# Patient Record
Sex: FEMALE | Race: WHITE | NOT HISPANIC OR LATINO
[De-identification: names, ages, dates, MRNs, and addresses within clinical notes are randomized per-mention and may not be internally consistent; named-entity substitution may affect disease eponyms.]

---

## 2017-05-14 ENCOUNTER — TRANSCRIPTION ENCOUNTER (OUTPATIENT)
Age: 47
End: 2017-05-14

## 2017-06-24 ENCOUNTER — TRANSCRIPTION ENCOUNTER (OUTPATIENT)
Age: 47
End: 2017-06-24

## 2022-08-05 PROBLEM — Z00.00 ENCOUNTER FOR PREVENTIVE HEALTH EXAMINATION: Status: ACTIVE | Noted: 2022-08-05

## 2023-06-30 ENCOUNTER — APPOINTMENT (OUTPATIENT)
Dept: CARDIOLOGY | Facility: CLINIC | Age: 53
End: 2023-06-30
Payer: COMMERCIAL

## 2023-06-30 VITALS
DIASTOLIC BLOOD PRESSURE: 70 MMHG | HEIGHT: 62 IN | HEART RATE: 61 BPM | WEIGHT: 126 LBS | SYSTOLIC BLOOD PRESSURE: 100 MMHG | BODY MASS INDEX: 23.19 KG/M2 | TEMPERATURE: 97.6 F

## 2023-06-30 DIAGNOSIS — Z87.74 PERSONAL HISTORY OF (CORRECTED) CONGENITAL MALFORMATIONS OF HEART AND CIRCULATORY SYSTEM: ICD-10-CM

## 2023-06-30 DIAGNOSIS — Z78.9 OTHER SPECIFIED HEALTH STATUS: ICD-10-CM

## 2023-06-30 DIAGNOSIS — Z01.818 ENCOUNTER FOR OTHER PREPROCEDURAL EXAMINATION: ICD-10-CM

## 2023-06-30 DIAGNOSIS — Z86.69 PERSONAL HISTORY OF OTHER DISEASES OF THE NERVOUS SYSTEM AND SENSE ORGANS: ICD-10-CM

## 2023-06-30 DIAGNOSIS — R94.31 ABNORMAL ELECTROCARDIOGRAM [ECG] [EKG]: ICD-10-CM

## 2023-06-30 PROCEDURE — 93000 ELECTROCARDIOGRAM COMPLETE: CPT | Mod: NC

## 2023-06-30 PROCEDURE — 93306 TTE W/DOPPLER COMPLETE: CPT

## 2023-06-30 PROCEDURE — 99203 OFFICE O/P NEW LOW 30 MIN: CPT | Mod: 25

## 2023-07-02 PROBLEM — R94.31 ABNORMAL ECG: Status: ACTIVE | Noted: 2023-07-02

## 2023-07-02 PROBLEM — Z78.9 NON-SMOKER: Status: ACTIVE | Noted: 2023-07-02

## 2023-07-02 PROBLEM — Z87.74 S/P VSD REPAIR: Status: ACTIVE | Noted: 2023-07-02

## 2023-07-02 PROBLEM — Z01.818 ENCOUNTER FOR PRE-OPERATIVE EXAMINATION: Status: RESOLVED | Noted: 2023-07-02 | Resolved: 2023-07-16

## 2023-07-02 PROBLEM — Z86.69 HISTORY OF SCIATICA: Status: RESOLVED | Noted: 2023-07-02 | Resolved: 2023-07-02

## 2023-07-02 NOTE — REVIEW OF SYSTEMS
[Joint Pain] : joint pain [Negative] : Neurological [FreeTextEntry5] : see hpi  [FreeTextEntry9] : see hpi

## 2023-07-02 NOTE — HISTORY OF PRESENT ILLNESS
[FreeTextEntry1] : 52 year old female  with PMHx of VSD repair at [facility] 1981 is here for pre operative evaluation prior to knee surgery. The patient exercises regularly at the gym with no chest pain shortness of breath or cardiac syx. Prior to knee injury had no exercise limitation. denies palpations syncope or pre syncope symptoms. Underwent recent colonoscopy with no issues form anesthesia. \par \par

## 2023-07-02 NOTE — ASSESSMENT
[FreeTextEntry1] : 52 year old female  with PMHx of VSD repair at [facility] 1981 is here for pre operative evaluation prior to knee surgery. The patient exercises regularly at the gym with no chest pain shortness of breath or cardiac syx. Prior to knee injury had no exercise limitation. denies palpations syncope or pre syncope symptoms. Underwent recent colonoscopy with no issues form anesthesia. \par \par Echocardiogram performed and reviewed no evidence of residual VSD normal LVEF 59% \par The patient has good exercise capacity. \par The patient is optimized from cardiac standpoint to undergo knee surgery, No cardiac workup is recommended prior to the procedure. \par

## 2023-07-02 NOTE — PHYSICAL EXAM
[No Edema] : no edema [Normal] : moves all extremities, no focal deficits, normal speech [de-identified] : Right knee pain

## 2023-07-17 ENCOUNTER — APPOINTMENT (OUTPATIENT)
Dept: OBGYN | Facility: CLINIC | Age: 53
End: 2023-07-17

## 2023-08-04 ENCOUNTER — APPOINTMENT (OUTPATIENT)
Dept: CARDIOLOGY | Facility: CLINIC | Age: 53
End: 2023-08-04
Payer: COMMERCIAL

## 2023-08-04 ENCOUNTER — NON-APPOINTMENT (OUTPATIENT)
Age: 53
End: 2023-08-04

## 2023-08-04 ENCOUNTER — TRANSCRIPTION ENCOUNTER (OUTPATIENT)
Age: 53
End: 2023-08-04

## 2023-08-04 DIAGNOSIS — Z91.89 OTHER SPECIFIED PERSONAL RISK FACTORS, NOT ELSEWHERE CLASSIFIED: ICD-10-CM

## 2023-08-04 PROCEDURE — ZZZZZ: CPT

## 2025-07-18 ENCOUNTER — NON-APPOINTMENT (OUTPATIENT)
Age: 55
End: 2025-07-18